# Patient Record
Sex: MALE | Race: ASIAN | URBAN - METROPOLITAN AREA
[De-identification: names, ages, dates, MRNs, and addresses within clinical notes are randomized per-mention and may not be internally consistent; named-entity substitution may affect disease eponyms.]

---

## 2018-08-18 ENCOUNTER — TRANSFERRED RECORDS (OUTPATIENT)
Dept: HEALTH INFORMATION MANAGEMENT | Facility: CLINIC | Age: 53
End: 2018-08-18

## 2018-09-05 ENCOUNTER — RECORDS - HEALTHEAST (OUTPATIENT)
Dept: ADMINISTRATIVE | Facility: OTHER | Age: 53
End: 2018-09-05

## 2018-09-18 ENCOUNTER — COMMUNICATION - HEALTHEAST (OUTPATIENT)
Dept: TELEHEALTH | Facility: CLINIC | Age: 53
End: 2018-09-18

## 2018-09-18 ENCOUNTER — HOSPITAL ENCOUNTER (OUTPATIENT)
Dept: CT IMAGING | Facility: CLINIC | Age: 53
Discharge: HOME | End: 2018-09-18

## 2018-09-18 DIAGNOSIS — Z13.9 ENCOUNTER FOR SCREENING: ICD-10-CM

## 2018-09-18 LAB
CV CALCIUM SCORE AGATSTON LM: 4
CV CALCIUM SCORING AGATSON LAD: 100
CV CALCIUM SCORING AGATSTON CX: 0
CV CALCIUM SCORING AGATSTON RCA: 19
CV CALCIUM SCORING AGATSTON TOTAL: 123

## 2018-10-11 ENCOUNTER — PRE VISIT (OUTPATIENT)
Dept: UROLOGY | Facility: CLINIC | Age: 53
End: 2018-10-11

## 2018-10-11 NOTE — TELEPHONE ENCOUNTER
MEDICAL RECORDS REQUEST   [facility]  Urology Clinic  [address] [place] [address]  PHONE: [phone]  Fax: [phone]        FUTURE VISIT INFORMATION                                                   Jase Chaudhry, : 1965 scheduled for future visit at [facility]    APPOINTMENT INFORMATION:    Date: 10/17/2018    Provider:  Joe Long    Reason for Visit/Diagnosis: Hematuria    REFERRAL INFORMATION:    Referring provider:  self    Specialty: self    Referring providers clinic:  self    Clinic contact number:  self    RECORDS REQUESTED FOR VISIT                                                     NOTES  STATUS/DETAILS   OFFICE NOTE from referring provider  in process   OFFICE NOTE from other specialist  no   DISCHARGE SUMMARY from hospital  no   DISCHARGE REPORT from the ER  no   OPERATIVE REPORT  no   MEDICATION LIST  in process       PRE-VISIT CHECKLIST      Record collection complete If no, please explain in process  Lvm for patient to call with MR information   Appointment appropriately scheduled           (right time/right provider) Yes   MyChart activation If no, please explain in process   Questionnaire complete If no, please explain in process     Completed by: Sabiha Graham

## 2018-10-15 ENCOUNTER — PRE VISIT (OUTPATIENT)
Dept: UROLOGY | Facility: CLINIC | Age: 53
End: 2018-10-15

## 2018-10-15 NOTE — TELEPHONE ENCOUNTER
Reason for visit: hematuria     Relevant information: self referred    Records/imaging/labs: in process    Pt called: no need for a call    Rooming: collect a urine, possible cystoscopy

## 2018-10-16 ENCOUNTER — PATIENT OUTREACH (OUTPATIENT)
Dept: CARE COORDINATION | Facility: CLINIC | Age: 53
End: 2018-10-16

## 2018-10-17 ENCOUNTER — RADIANT APPOINTMENT (OUTPATIENT)
Dept: CT IMAGING | Facility: CLINIC | Age: 53
End: 2018-10-17
Attending: UROLOGY
Payer: COMMERCIAL

## 2018-10-17 ENCOUNTER — OFFICE VISIT (OUTPATIENT)
Dept: UROLOGY | Facility: CLINIC | Age: 53
End: 2018-10-17
Payer: COMMERCIAL

## 2018-10-17 VITALS
SYSTOLIC BLOOD PRESSURE: 126 MMHG | HEIGHT: 71 IN | DIASTOLIC BLOOD PRESSURE: 83 MMHG | WEIGHT: 170 LBS | HEART RATE: 85 BPM | BODY MASS INDEX: 23.8 KG/M2

## 2018-10-17 DIAGNOSIS — R31.29 MICROSCOPIC HEMATURIA: ICD-10-CM

## 2018-10-17 DIAGNOSIS — R31.29 MICROSCOPIC HEMATURIA: Primary | ICD-10-CM

## 2018-10-17 LAB
ALBUMIN UR-MCNC: NEGATIVE MG/DL
AMORPH CRY #/AREA URNS HPF: ABNORMAL /HPF
APPEARANCE UR: ABNORMAL
BILIRUB UR QL STRIP: NEGATIVE
COLOR UR AUTO: YELLOW
GLUCOSE UR STRIP-MCNC: NEGATIVE MG/DL
HGB UR QL STRIP: NEGATIVE
KETONES UR STRIP-MCNC: NEGATIVE MG/DL
LEUKOCYTE ESTERASE UR QL STRIP: NEGATIVE
MUCOUS THREADS #/AREA URNS LPF: PRESENT /LPF
NITRATE UR QL: NEGATIVE
PH UR STRIP: 7 PH (ref 5–7)
RBC #/AREA URNS AUTO: <1 /HPF (ref 0–2)
SOURCE: ABNORMAL
SP GR UR STRIP: 1.02 (ref 1–1.03)
UROBILINOGEN UR STRIP-MCNC: 0 MG/DL (ref 0–2)
WBC #/AREA URNS AUTO: 4 /HPF (ref 0–5)

## 2018-10-17 ASSESSMENT — ENCOUNTER SYMPTOMS
MUSCLE WEAKNESS: 0
STIFFNESS: 0
NECK PAIN: 1
BACK PAIN: 0
ARTHRALGIAS: 1
MUSCLE CRAMPS: 0
MYALGIAS: 0
JOINT SWELLING: 0

## 2018-10-17 NOTE — PROGRESS NOTES
Pre-procedure diagnosis: Microhematuria  Post procedure diagnosis: normal cystoscopy  Procedure performed: cystoscopy  Surgeon: LINDA oLng MD  Anesthesia: local    Indications for procedure: Patient is a 53 year old male with a history of hematuria.  Here today for cysto    Description of procedure: After fully informed voluntary consent was obtained patient was brought into the procedure room, identified and placed in a supine position on the cysto table.  The groin/scrotum were prepped and draped in a sterile fashion with betadine.  A 15F flexible cystoscope was inserted into the urethra and the bladder and urethra examined in a systematic manner.  There were no tumor stones or diverticula.  Ureteric orifices were normal in position and number and effluxing clear urine.  The prostate was 3 cm long and showed bilobar hypertrophy.  There was a small median lobe.  Distal urethra was normal.  The patient tolerated the procedure well and there were no complications.      Assessment/Plan: Patient with a history of hematuria with negative cystoscopy.  Urinalysis and CT scan.  Uroflow and PVR to assess outflow and retention as he had a lot of debris in his bladder .    Answers for HPI/ROS submitted by the patient on 10/17/2018   General Symptoms: No  Skin Symptoms: No  HENT Symptoms: No  EYE SYMPTOMS: No  HEART SYMPTOMS: No  LUNG SYMPTOMS: No  INTESTINAL SYMPTOMS: No  URINARY SYMPTOMS: No  REPRODUCTIVE SYMPTOMS: No  SKELETAL SYMPTOMS: Yes  BLOOD SYMPTOMS: No  NERVOUS SYSTEM SYMPTOMS: No  MENTAL HEALTH SYMPTOMS: No  Back pain: No  Muscle aches: No  Neck pain: Yes  Swollen joints: No  Joint pain: Yes  Bone pain: No  Muscle cramps: No  Muscle weakness: No  Joint stiffness: No  Bone fracture: No  PHQ-2 Score: 0

## 2018-10-17 NOTE — PROGRESS NOTES
"We are pleased to see Mr. Jase Chaudhry for the evaluation of chief complaint listed below    Chief Complaint:    MIcroscopic hematuria         History of Present Illness:   Jase Chaudhry is a(n) 53 year old male w/ PH of meniscal tear  And no significant urologic history presents with microscopic hematuria on routine urine evaluation during his yearly physical examination.  There is no significant history of any urinary symptoms.           Past Medical History:   He had a meniscal tear and was under physical therapy treatment.         Past Surgical History:   No past surgical history on file. History of left orchiectomy as a 6 year old.         Social History:   Works as a R and D executive at [employer]        Smoking: non smoker  Alcohol: occassional   IV Drug Use: None         Family History:   No family history on file.  No urologic cancers in the family.  History of Dm and Heart disease in the family         Allergies:   No Known Allergies         Medications:     Current Outpatient Prescriptions   Medication Sig     Multiple Vitamin (MULTIVITAMINS PO) Take by mouth daily     No current facility-administered medications for this visit.             REVIEW OF SYSTEMS:    See HPI for pertinent details.  Remainder of 10-point ROS negative.         PHYSICAL EXAM   /83  Pulse 85  Ht 1.791 m (5' 10.5\")  Wt 77.1 kg (170 lb)  BMI 24.05 kg/m2  GENERAL: No acute distress. Well nourished.   HEENT:  Sclerae anicteric.  Conjunctivae pink.  Moist mucous membranes.  NECK:  Supple.  No lymphadenopathy.  CARDIAC:  Regular rate and rhythm.  LUNGS:  Non-labored breathing  BACK:  No costovertebral tenderness.  ABDOMEN: Soft, non-tender, no surgical scars, no organomegaly, non-tender.  :  Phallus circumcised, meatus adequate, no plaques palpated. Testes descended on the right left absent, no intratesticular masses.  Epididymes non-tender.  No varicoceles or inguinal hernias.  RECTAL:  Good tone.  Prostate smooth, symmetric, " non-tender, no nodules. Normal size.  SKIN: No rashes.  Dry.     EXTREMITIES:  Warm, well perfused.    NEURO: normal gait, no focal deficits.           LABS AND IMAGING:   Urine Routine: 3-10 RBC per hpf          ASSESSMENT:   Jase Chaudhry is a 53 year old male who presents for assessment of microscopic hematuria            PLAN:     CT Abdomen and pelvis without contrast stone protocol    Urine Culture    Cystoscopy today    Jigar Martin MD  Fellow    Patient seen and examined with the resident.  Visit time 30 minutes and >50% spent in counseling.  I agree with the resident's note and plan of care.       Joe Long MD  Urology Staff      CC: System

## 2018-10-17 NOTE — LETTER
"10/17/2018       RE: Jase Chaudhry  [address] [address]     Dear Colleague,    Thank you for referring your patient, Jase Chaudhry, to the [facility] UROLOGY AND INST FOR PROSTATE AND UROLOGIC CANCERS at [facility]. Please see a copy of my visit note below.    We are pleased to see Mr. Jase Chaudhry for the evaluation of chief complaint listed below    Chief Complaint:    MIcroscopic hematuria         History of Present Illness:   Jase Chaudhry is a(n) 53 year old male w/ PH of meniscal tear  And no significant urologic history presents with microscopic hematuria on routine urine evaluation during his yearly physical examination.  There is no significant history of any urinary symptoms.           Past Medical History:   He had a meniscal tear and was under physical therapy treatment.         Past Surgical History:   No past surgical history on file. History of left orchiectomy as a 6 year old.         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  No urologic cancers in the family.  History of Dm and Heart disease in the family         Allergies:   No Known Allergies         Medications:     Current Outpatient Prescriptions   Medication Sig     Multiple Vitamin (MULTIVITAMINS PO) Take by mouth daily     No current facility-administered medications for this visit.           PHYSICAL EXAM   /83  Pulse 85  Ht 1.791 m (5' 10.5\")  Wt 77.1 kg (170 lb)  BMI 24.05 kg/m2  GENERAL: No acute distress. Well nourished.   HEENT:  Sclerae anicteric.  Conjunctivae pink.  Moist mucous membranes.  NECK:  Supple.  No lymphadenopathy.  CARDIAC:  Regular rate and rhythm.  LUNGS:  Non-labored breathing  BACK:  No costovertebral tenderness.  ABDOMEN: Soft, non-tender, no surgical scars, no organomegaly, non-tender.  :  Phallus " circumcised, meatus adequate, no plaques palpated. Testes descended on the right left absent, no intratesticular masses.  Epididymes non-tender.  No varicoceles or inguinal hernias.  RECTAL:  Good tone.  Prostate smooth, symmetric, non-tender, no nodules. Normal size.  SKIN: No rashes.  Dry.     EXTREMITIES:  Warm, well perfused.    NEURO: normal gait, no focal deficits.           LABS AND IMAGING:   Urine Routine: 3-10 RBC per hpf          ASSESSMENT:   Jase Chaudhry is a 53 year old male who presents for assessment of microscopic hematuria            PLAN:     CT Abdomen and pelvis without contrast stone protocol    Urine Culture    Cystoscopy today    Jigar Martin MD  Fellow    Patient seen and examined with the resident.  Visit time 30 minutes and >50% spent in counseling.  I agree with the resident's note and plan of care.       Joe Long MD  Urology Staff      CC: System    Pre-procedure diagnosis: Microhematuria  Post procedure diagnosis: normal cystoscopy  Procedure performed: cystoscopy  Surgeon: LINDA Long MD  Anesthesia: local    Indications for procedure: Patient is a 53 year old male with a history of hematuria.  Here today for cysto    Description of procedure: After fully informed voluntary consent was obtained patient was brought into the procedure room, identified and placed in a supine position on the cysto table.  The groin/scrotum were prepped and draped in a sterile fashion with betadine.  A 15F flexible cystoscope was inserted into the urethra and the bladder and urethra examined in a systematic manner.  There were no tumor stones or diverticula.  Ureteric orifices were normal in position and number and effluxing clear urine.  The prostate was 3 cm long and showed bilobar hypertrophy.  There was a small median lobe.  Distal urethra was normal.  The patient tolerated the procedure well and there were no complications.      Assessment/Plan: Patient with a history of hematuria with  negative cystoscopy.  Urinalysis and CT scan.  Uroflow and PVR to assess outflow and retention as he had a lot of debris in his bladder .

## 2018-10-17 NOTE — NURSING NOTE
Invasive Procedure Safety Checklist:    Procedure: CYSTOSCOPY    Action: Complete sections and checkboxes as appropriate.    Pre-procedure:  1. Patient ID Verified with 2 identifiers (Dorothy and  or MRN) : YES    2. Procedure and site verified with patient/designee (when able) : YES    3. Accurate consent documentation in medical record : YES    4. H&P (or appropriate assessment) documented in medical record : NO  H&P must be up to 30 days prior to procedure an updated within 24 hours of                 Procedure as applicable.     5. Relevant diagnostic and radiology test results appropriately labeled and displayed as applicable : NO    6. Blood products, implants, devices, and/or special equipment available for the procedure as applicable : NO    7. Procedure site(s) marked with provider initials [Exclusions: YES] : YES    8. Marking not required. Reason : Yes  Procedure does not require site marking    Time Out:     Time-Out performed immediately prior to starting procedure, including verbal and active participation of all team members addressing: YES    1. Correct patient identity.  2. Confirmed that the correct side and site are marked.  3. An accurate procedure to be done.  4. Agreement on the procedure to be done.  5. Correct patient position.  6. Relevant images and results are properly labeled and appropriately displayed.  7. The need to administer antibiotics or fluids for irrigation purposes during the procedure as applicable.  8. Safety precautions based on patient history or medication use.    During Procedure: Verification of correct person, site, and procedure occurs any time the responsibility for care of the patient is transferred to another member of the care team.    The following medication was given:     MEDICATION:  LIDOCAINE HCL JELLY USP 2%  ROUTE: THROUGH URETHRAL  SITE: URETHRA  DOSE: 10 ML  LOT #: [id number]  : Parking[employer],LIMITED  EXPIRATION DATE:   NDC#: [address]-7   Was there  drug waste? No  Multi-dose vial: No    Kalpesh El, BENTLEY  October 17, 2018

## 2018-10-17 NOTE — PATIENT INSTRUCTIONS
Patient scheduled a ct scan       Kalpesh El MA      It was a pleasure meeting with you today.  Thank you for allowing me and my team the privilege of caring for you today.  YOU are the reason we are here, and I truly hope we provided you with the excellent service you deserve.  Please let us know if there is anything else we can do for you so that we can be sure you are leaving completely satisfied with your care experience.

## 2018-10-17 NOTE — MR AVS SNAPSHOT
After Visit Summary   10/17/2018    Jase Chaudhry    MRN: [id number]           Patient Information     Date Of Birth          1965        Visit Information        Provider Department      10/17/2018 2:00 PM Joe Long MD [facility] Urology and [facility]        Today's Diagnoses     Microscopic hematuria    -  1      Care Instructions    Patient scheduled a ct scan       Kalpesh FARIBALindsay El MA      It was a pleasure meeting with you today.  Thank you for allowing me and my team the privilege of caring for you today.  YOU are the reason we are here, and I truly hope we provided you with the excellent service you deserve.  Please let us know if there is anything else we can do for you so that we can be sure you are leaving completely satisfied with your care experience.                Follow-ups after your visit        Future tests that were ordered for you today     Open Future Orders        Priority Expected Expires Ordered    CT Abdomen pelvis w/o contrast Routine  10/17/2019 10/17/2018            Who to contact     Please call your clinic at [phone] to:    Ask questions about your health    Make or cancel appointments    Discuss your medicines    Learn about your test results    Speak to your doctor            Additional Information About Your Visit        MyChart Information     [employer] is an electronic gateway that provides easy, online access to your medical records. With [employer], you can request a clinic appointment, read your test results, renew a prescription or communicate with your care team.     To sign up for [employer] visit the website at [email or web address]   You will be asked to enter the access code listed below, as well as some personal information. Please follow the directions to create your username and password.     Your access code is: [email or web address]  Expires: 2019  6:30 AM     Your access code will  in 90 days. If you  "need help or a new code, please contact your [facility] or call [phone] for assistance.        Care EveryWhere ID     This is your Care EveryWhere ID. This could be used by other organizations to access your [place] medical records  [id number]        Your Vitals Were     Pulse Height BMI (Body Mass Index)             85 1.791 m (5' 10.5\") 24.05 kg/m2          Blood Pressure from Last 3 Encounters:   10/17/18 126/83    Weight from Last 3 Encounters:   10/17/18 77.1 kg (170 lb)              We Performed the Following     Urine Culture Aerobic Bacterial        Primary Care Provider    None Specified       No primary provider on file.        Equal Access to Services     [facility] Vista[facility]ARIBA : Hadii shae Ramos, waemilyda lufranklinadaha, woodrow kaalmada ciara, johnathan de jesus . So [facility] [phone].    ATENCIÓN: Si habla español, tiene a waters disposición servicios gratuitos de asistencia lingüística. Llame al [phone].    We comply with applicable federal civil rights laws and [place] laws. We do not discriminate on the basis of race, color, national origin, age, disability, sex, sexual orientation, or gender identity.            Thank you!     Thank you for choosing [facility] UROLOGY AND [facility] FOR PROSTATE AND UROLOGIC CANCERS  for your care. Our goal is always to provide you with excellent care. Hearing back from our patients is one way we can continue to improve our services. Please take a few minutes to complete the written survey that you may receive in the mail after your visit with us. Thank you!             Your Updated Medication List - Protect others around you: Learn how to safely use, store and throw away your medicines at www.disposemymeds.org.          This list is accurate as of 10/17/18  3:55 PM.  Always use your most recent med list.                   Brand Name Dispense Instructions for use Diagnosis    MULTIVITAMINS PO      Take by mouth " daily

## 2018-10-18 LAB
BACTERIA SPEC CULT: NO GROWTH
Lab: NORMAL
SPECIMEN SOURCE: NORMAL

## 2018-10-23 PROBLEM — R31.29 MICROSCOPIC HEMATURIA: Status: ACTIVE | Noted: 2018-10-23

## 2021-05-29 ENCOUNTER — RECORDS - HEALTHEAST (OUTPATIENT)
Dept: ADMINISTRATIVE | Facility: CLINIC | Age: 56
End: 2021-05-29